# Patient Record
Sex: FEMALE | Race: WHITE | NOT HISPANIC OR LATINO | URBAN - METROPOLITAN AREA
[De-identification: names, ages, dates, MRNs, and addresses within clinical notes are randomized per-mention and may not be internally consistent; named-entity substitution may affect disease eponyms.]

---

## 2025-03-03 ENCOUNTER — OFFICE VISIT (OUTPATIENT)
Dept: URGENT CARE | Age: 50
End: 2025-03-03
Payer: COMMERCIAL

## 2025-03-03 VITALS
HEART RATE: 99 BPM | TEMPERATURE: 98.1 F | OXYGEN SATURATION: 99 % | RESPIRATION RATE: 16 BRPM | DIASTOLIC BLOOD PRESSURE: 93 MMHG | SYSTOLIC BLOOD PRESSURE: 138 MMHG

## 2025-03-03 DIAGNOSIS — J02.9 SORE THROAT: ICD-10-CM

## 2025-03-03 DIAGNOSIS — J03.90 TONSILLITIS: Primary | ICD-10-CM

## 2025-03-03 LAB — POC RAPID STREP: NEGATIVE

## 2025-03-03 PROCEDURE — 87880 STREP A ASSAY W/OPTIC: CPT | Performed by: NURSE PRACTITIONER

## 2025-03-03 PROCEDURE — 99213 OFFICE O/P EST LOW 20 MIN: CPT | Performed by: NURSE PRACTITIONER

## 2025-03-03 RX ORDER — AMOXICILLIN 500 MG/1
500 CAPSULE ORAL EVERY 12 HOURS SCHEDULED
Qty: 20 CAPSULE | Refills: 0 | Status: SHIPPED | OUTPATIENT
Start: 2025-03-03 | End: 2025-03-13

## 2025-03-03 ASSESSMENT — ENCOUNTER SYMPTOMS
FEVER: 0
FATIGUE: 1
HEADACHES: 1
CHILLS: 1
COUGH: 0
SORE THROAT: 1

## 2025-03-03 ASSESSMENT — PAIN SCALES - GENERAL: PAINLEVEL_OUTOF10: 6

## 2025-03-03 NOTE — PROGRESS NOTES
Subjective   Patient ID: Rimma Watson is a 49 y.o. female. They present today with a chief complaint of Sore Throat (ST, chills, sinus drainage X 12 hrs. ).    History of Present Illness    Sore Throat   Associated symptoms include headaches. Pertinent negatives include no congestion, coughing or ear pain.       Patient presents to urgent care for complaints of sore throat, chills and fatigue for 12 hours. Patient reports that her coworker recently tested positive for strep.    Past Medical History  Allergies as of 03/03/2025    (No Known Allergies)       (Not in a hospital admission)       No past medical history on file.    No past surgical history on file.     reports that she has been smoking cigarettes. She has never used smokeless tobacco. She reports current alcohol use. She reports that she does not use drugs.    Review of Systems  Review of Systems   Constitutional:  Positive for chills and fatigue. Negative for fever.   HENT:  Positive for sore throat. Negative for congestion and ear pain.    Respiratory:  Negative for cough.    Neurological:  Positive for headaches.                                  Objective    Vitals:    03/03/25 1554   BP: (!) 138/93   Pulse: 99   Resp: 16   Temp: 36.7 °C (98.1 °F)   SpO2: 99%     Patient's last menstrual period was 08/30/2023 (exact date).    Physical Exam  Vitals reviewed.   HENT:      Head: Normocephalic.      Right Ear: Tympanic membrane, ear canal and external ear normal.      Left Ear: Tympanic membrane, ear canal and external ear normal.      Nose: Nose normal.      Mouth/Throat:      Mouth: Mucous membranes are moist.      Pharynx: Posterior oropharyngeal erythema present.      Tonsils: Tonsillar exudate present. 3+ on the right. 3+ on the left.   Eyes:      Conjunctiva/sclera: Conjunctivae normal.   Cardiovascular:      Rate and Rhythm: Normal rate and regular rhythm.      Heart sounds: Normal heart sounds.   Pulmonary:      Effort: Pulmonary effort is  normal.      Breath sounds: Normal breath sounds.   Lymphadenopathy:      Cervical: Cervical adenopathy present.   Skin:     General: Skin is warm and dry.   Neurological:      Mental Status: She is alert.         Procedures    Point of Care Test & Imaging Results from this visit  Results for orders placed or performed in visit on 03/03/25   POCT rapid strep A manually resulted   Result Value Ref Range    POC Rapid Strep Negative Negative      No results found.    Diagnostic study results (if any) were reviewed by SOHAIL Hanks.    Assessment/Plan   Allergies, medications, history, and pertinent labs/EKGs/Imaging reviewed by SOHAIL Hanks.     Medical Decision Making  Rapid strep was negative. On physical exam patient did have exudate, posterior erythematous, and patient did have cervical adenopathy. Will start patient on amoxicillin for acute tonsillitis.     At time of discharge patient was clinically well-appearing and HDS for outpatient management. The patient and/or family was educated regarding diagnosis, supportive care, OTC and Rx medications. The patient and/or family was given the opportunity to ask questions prior to discharge.  They verbalized understanding of my discussion of the plans for treatment, expected course, indications to return to  or seek further evaluation in ED, and the need for timely follow up as directed.   They were provided with a work/school excuse if requested.      Orders and Diagnoses  Diagnoses and all orders for this visit:  Tonsillitis  -     amoxicillin (Amoxil) 500 mg capsule; Take 1 capsule (500 mg) by mouth every 12 hours for 10 days.  Sore throat  -     POCT rapid strep A manually resulted      Medical Admin Record      Patient disposition: Home    Electronically signed by SOHAIL Hanks  4:29 PM

## 2025-06-09 ENCOUNTER — OFFICE VISIT (OUTPATIENT)
Dept: URGENT CARE | Age: 50
End: 2025-06-09
Payer: COMMERCIAL

## 2025-06-09 VITALS
HEART RATE: 80 BPM | OXYGEN SATURATION: 96 % | DIASTOLIC BLOOD PRESSURE: 84 MMHG | SYSTOLIC BLOOD PRESSURE: 125 MMHG | WEIGHT: 140 LBS | BODY MASS INDEX: 21.93 KG/M2 | TEMPERATURE: 98.2 F

## 2025-06-09 DIAGNOSIS — F17.200 CURRENT SMOKER: ICD-10-CM

## 2025-06-09 DIAGNOSIS — J02.9 VIRAL PHARYNGITIS: ICD-10-CM

## 2025-06-09 DIAGNOSIS — B34.8 RHINOVIRUS INFECTION: Primary | ICD-10-CM

## 2025-06-09 DIAGNOSIS — J02.9 SORE THROAT: ICD-10-CM

## 2025-06-09 LAB
POC HUMAN RHINOVIRUS PCR: POSITIVE
POC INFLUENZA A VIRUS PCR: NEGATIVE
POC INFLUENZA B VIRUS PCR: NEGATIVE
POC RESPIRATORY SYNCYTIAL VIRUS PCR: NEGATIVE
POC STREPTOCOCCUS PYOGENES (GROUP A STREP) PCR: NEGATIVE

## 2025-06-09 RX ORDER — LIDOCAINE HYDROCHLORIDE 20 MG/ML
5 SOLUTION OROPHARYNGEAL AS NEEDED
Qty: 100 ML | Refills: 0 | Status: SHIPPED | OUTPATIENT
Start: 2025-06-09 | End: 2025-06-14

## 2025-06-09 NOTE — PATIENT INSTRUCTIONS
You have tested positive for rhinovirus, which is a common viral cause of upper respiratory infections. Your symptoms, including swollen tonsils, are likely due to this viral illness. Antibiotics are not effective for viral infections, but supportive care can help ease symptoms.  Medications & Treatment:  You received a dose of dexamethasone in clinic today to help reduce inflammation and discomfort.  Use the prescribed lidocaine viscous mixed with liquid diphenhydramine in a 1:1 ratio as needed for throat pain. Swish and gargle, then spit out--do not swallow.  Stay well hydrated and rest as much as possible.  You may use acetaminophen (Tylenol) or ibuprofen (Advil/Motrin) for fever or discomfort as needed.  Symptom Monitoring / Red Flag Precautions:  Seek prompt medical attention if you experience:  Difficulty breathing or swallowing  High fever (>= 102°F) that persists or worsens  Inability to stay hydrated  Swelling of the neck or face  Persistent vomiting or worsening fatigue  Follow-Up:  Symptoms typically improve within 7-10 days. If symptoms worsen or persist beyond 10 days, follow up with your primary care provider.  Other Tips:  Use a humidifier and warm fluids to soothe throat and congestion.  Avoid irritants like smoke.  Practice good hand hygiene to prevent spreading the virus to others.

## 2025-06-09 NOTE — PROGRESS NOTES
Subjective   Chief Complaint:  Rimma Watson presents with complaints of a sore throat this visit.    HPI:49-year-old female presents with a 3-day history of sore throat, dry cough, nasal congestion, and postnasal drainage. She reports her ears feel full, itchy, and mildly painful, particularly when swallowing or during changes in pressure. She denies ear discharge or hearing loss. Symptoms began gradually and have been getting slightly worse each day. She works in a [employer] and is frequently exposed to large numbers of people, raising concern for potential viral or environmental exposure. She denies fever, chills, shortness of breath, wheezing, chest pain, nausea, vomiting, diarrhea, or recent travel. She also denies recent antibiotic use, history of seasonal allergies, or similar episodes in the recent past. No known sick contacts at home or work. She has been managing symptoms at home with over-the-counter ibuprofen  with minimal relief. No current use of decongestants or antihistamines. No history of asthma or chronic sinus issues.    Onset:: 3 days ago  Course: persistent  Progression: worsened  Severity:moderate  Helpful treatments: NSAID's  Unhelpful treatments tried: NSAID's    Review of Systems    Objective   Physical Exam: /84 (BP Location: Left arm, Patient Position: Sitting)   Pulse 80   Temp 36.8 °C (98.2 °F) (Oral)   Wt 63.5 kg (140 lb)   SpO2 96%   BMI 21.93 kg/m²   General appearance: alert and oriented, in no acute distress  Mouth/throat: Throat- edema-moderate, erythema-moderate, and tonsils- enlarged 3+  Neck: neck- supple, no mass, non-tender and no bruits    Rapid Strep: negative    Assessment/Plan   Diagnoses and all orders for this visit:  Sore throat  -     POCT SPOTFIRE R/ST Panel Mini w/Strep A ([facility]) manually resulted  -     lidocaine (Lidocaine Viscous) 2 % solution; Take 5 mL by mouth if needed for mild pain (1 - 3) for up to 5 days.  Rhinovirus infection  Viral  pharyngitis  -     dexAMETHasone (Decadron) oral liquid 10 mg